# Patient Record
Sex: FEMALE | Race: WHITE | NOT HISPANIC OR LATINO | URBAN - METROPOLITAN AREA
[De-identification: names, ages, dates, MRNs, and addresses within clinical notes are randomized per-mention and may not be internally consistent; named-entity substitution may affect disease eponyms.]

---

## 2017-12-11 ENCOUNTER — IMPORTED ENCOUNTER (OUTPATIENT)
Dept: URBAN - METROPOLITAN AREA CLINIC 50 | Facility: CLINIC | Age: 82
End: 2017-12-11

## 2018-02-09 ENCOUNTER — IMPORTED ENCOUNTER (OUTPATIENT)
Dept: URBAN - METROPOLITAN AREA CLINIC 50 | Facility: CLINIC | Age: 83
End: 2018-02-09

## 2018-09-10 ENCOUNTER — IMPORTED ENCOUNTER (OUTPATIENT)
Dept: URBAN - METROPOLITAN AREA CLINIC 50 | Facility: CLINIC | Age: 83
End: 2018-09-10

## 2021-10-25 NOTE — PATIENT DISCUSSION
Despite some risk factors, the patient does not demonstrate definitive evidence of glaucoma at this time. normal...

## 2021-12-06 NOTE — PROCEDURE NOTE: SURGICAL
<p>Prior to commencing surgery, patient identification, surgical procedure, site, and side were confirmed by Dr. Arti Mahoeny. Following topical proparacaine anesthesia, the patient was positioned at the YAG laser, a contact lens coupled to the cornea with methylcellulose and a peripheral iridotomy placed using 2.9 Mj x 27. without complication. Excess methylcellulose was washed from the eyes, one drop of Econopred Plus 1% instilled and the patient returned to the holding area having tolerated the procedure well and without complication. </p><p>MRN: [id number]</p><p>&nbsp;</p>

## 2021-12-13 NOTE — PROCEDURE NOTE: SURGICAL
<p>Prior to commencing surgery, patient identification, surgical procedure, site, and side were confirmed by Dr. Lilliam Weinberg. Following topical proparacaine anesthesia, the patient was positioned at the YAG laser, a contact lens coupled to the cornea with methylcellulose and a peripheral iridotomy placed using 3.0 Mj x 13. without complication. Excess methylcellulose was washed from the eyes, one drop of Econopred Plus 1% instilled and the patient returned to the holding area having tolerated the procedure well and without complication. </p><p>MRN: [id number]</p><p>&nbsp;</p>

## 2022-04-25 NOTE — PATIENT DISCUSSION
VISUAL FIELD BASELINE OU. REC: STARTING TREATMENT AT THIS TIME. DISCUSSED OPTIONS. START LUMIGAN OU QHS.

## 2022-06-27 NOTE — PATIENT DISCUSSION
"UNABLE TO ACESESS RESPONSE TO LUMIGAN AS PT RAN OUT. PT REPORTS LUMIGAN CAUSING SOME REDNESS AND IS ""UNCOMFORTABLE"" WILL TRY BETIMOL QAM OU. "

## 2022-12-05 NOTE — PATIENT DISCUSSION
PT TOLERATING BETIMOL WELL.   CONTINUE TO MONITOR W/ MCM EVERY 6 MONTHS W/ OCT, HVF AND OPTIC NERVE EVAL.  IF CHANGES, SEND BACK TO [facility].